# Patient Record
Sex: FEMALE | Race: BLACK OR AFRICAN AMERICAN | NOT HISPANIC OR LATINO | URBAN - NONMETROPOLITAN AREA
[De-identification: names, ages, dates, MRNs, and addresses within clinical notes are randomized per-mention and may not be internally consistent; named-entity substitution may affect disease eponyms.]

---

## 2020-08-19 ENCOUNTER — IMPORTED ENCOUNTER (OUTPATIENT)
Dept: URBAN - NONMETROPOLITAN AREA CLINIC 1 | Facility: CLINIC | Age: 50
End: 2020-08-19

## 2020-08-19 PROBLEM — H52.223: Noted: 2020-08-19

## 2020-08-19 PROBLEM — H52.4: Noted: 2020-08-19

## 2020-08-19 PROCEDURE — 92014 COMPRE OPH EXAM EST PT 1/>: CPT

## 2020-08-19 PROCEDURE — 92310 CONTACT LENS FITTING OU: CPT

## 2020-08-19 PROCEDURE — 92015 DETERMINE REFRACTIVE STATE: CPT

## 2020-08-19 NOTE — PATIENT DISCUSSION
Myopia-Discussed diagnosis with patient. -Explained that people who are myopic are at a higher risk for developing RD/RT and reviewed associated S&S.-Pt to contact our office if symptoms develop. Astigmatism-Discussed diagnosis with patient. Presbyopia-Discussed diagnosis with patient. Updated spec Rx given. Recommend lens that will provide comfort as well as protect safety and health of eyes. CL wear-CLs fit and center well.-Stressed that patient should not sleep in CL. -Updated CL Rx given. -CL care and precautions given.

## 2022-04-09 ASSESSMENT — VISUAL ACUITY
OS_CC: J1
OU_SC: J1+
OS_SC: 20/20
OD_SC: J1
OD_SC: 20/20
OS_SC: J1
OD_CC: J1
OU_CC: J1+

## 2022-04-09 ASSESSMENT — TONOMETRY
OS_IOP_MMHG: 17
OD_IOP_MMHG: 17